# Patient Record
Sex: MALE | Race: WHITE | NOT HISPANIC OR LATINO | URBAN - METROPOLITAN AREA
[De-identification: names, ages, dates, MRNs, and addresses within clinical notes are randomized per-mention and may not be internally consistent; named-entity substitution may affect disease eponyms.]

---

## 2017-09-14 ENCOUNTER — RECORDS - HEALTHEAST (OUTPATIENT)
Dept: LAB | Facility: CLINIC | Age: 52
End: 2017-09-14

## 2017-09-18 ENCOUNTER — RECORDS - HEALTHEAST (OUTPATIENT)
Dept: LAB | Facility: CLINIC | Age: 52
End: 2017-09-18

## 2017-09-18 LAB
GLIADIN IGA SER-ACNC: 0.4 U/ML
GLIADIN IGG SER-ACNC: <0.4 U/ML
IGA SERPL-MCNC: 211 MG/DL (ref 65–400)
TTG IGA SER-ACNC: 0.2 U/ML
TTG IGG SER-ACNC: <0.6 U/ML

## 2017-09-19 LAB — HCV AB SERPL QL IA: NEGATIVE

## 2017-09-20 LAB
ANA SER QL: 0.1 U
HBV CORE AB SERPL QL IA: NEGATIVE
HEPATITIS B SURFACE ANTIBODY LHE- HISTORICAL: NEGATIVE

## 2019-01-30 ENCOUNTER — TRANSFERRED RECORDS (OUTPATIENT)
Dept: HEALTH INFORMATION MANAGEMENT | Facility: CLINIC | Age: 54
End: 2019-01-30

## 2019-01-31 ENCOUNTER — TRANSFERRED RECORDS (OUTPATIENT)
Dept: HEALTH INFORMATION MANAGEMENT | Facility: CLINIC | Age: 54
End: 2019-01-31

## 2019-02-01 ENCOUNTER — OFFICE VISIT (OUTPATIENT)
Dept: NEUROLOGY | Facility: CLINIC | Age: 54
End: 2019-02-01
Payer: COMMERCIAL

## 2019-02-01 VITALS
BODY MASS INDEX: 26.52 KG/M2 | WEIGHT: 165 LBS | SYSTOLIC BLOOD PRESSURE: 155 MMHG | DIASTOLIC BLOOD PRESSURE: 85 MMHG | HEART RATE: 67 BPM | OXYGEN SATURATION: 97 % | TEMPERATURE: 98 F | RESPIRATION RATE: 16 BRPM | HEIGHT: 66 IN

## 2019-02-01 DIAGNOSIS — G43.109 MIGRAINE WITH AURA AND WITHOUT STATUS MIGRAINOSUS, NOT INTRACTABLE: Primary | ICD-10-CM

## 2019-02-01 DIAGNOSIS — R93.0 ABNORMAL MRI SCAN, HEAD: ICD-10-CM

## 2019-02-01 DIAGNOSIS — I65.01 VERTEBRAL ARTERY STENOSIS, ASYMPTOMATIC, RIGHT: ICD-10-CM

## 2019-02-01 RX ORDER — IBUPROFEN 600 MG/1
600 TABLET, FILM COATED ORAL
COMMUNITY
Start: 2015-03-31

## 2019-02-01 SDOH — HEALTH STABILITY: MENTAL HEALTH: HOW OFTEN DO YOU HAVE A DRINK CONTAINING ALCOHOL?: MONTHLY OR LESS

## 2019-02-01 ASSESSMENT — ENCOUNTER SYMPTOMS
DISTURBANCES IN COORDINATION: 0
SEIZURES: 0
WEAKNESS: 0
SPEECH CHANGE: 0
TREMORS: 0
DOUBLE VISION: 1
LOSS OF CONSCIOUSNESS: 0
HEADACHES: 1
PARALYSIS: 0
MEMORY LOSS: 0
NUMBNESS: 0
DIZZINESS: 1
TINGLING: 0

## 2019-02-01 ASSESSMENT — PAIN SCALES - GENERAL: PAINLEVEL: MILD PAIN (3)

## 2019-02-01 ASSESSMENT — MIFFLIN-ST. JEOR: SCORE: 1531.19

## 2019-02-01 NOTE — NURSING NOTE
Chief Complaint   Patient presents with     Consult     P NEW CONSULT FOR VISION/HEADACHE      Headache     Distored Vision     Delroy Herron, EMT

## 2019-02-01 NOTE — LETTER
2019       RE: Sudheer Srivastava  [address] [place] [address]     Dear Colleague,    Thank you for referring your patient, Sudheer Srivastava, to the [facility] NEUROLOGY at [facility]. Please see a copy of my visit note below.    Service Date: 2019      RE: Sudheer Srivastava   MRN: [id number]   : 1965      Dear Dr. Estrada:      Your patient, Sudheer Srivastava, requested comprehensive neurologic consultation today on 2019.  His chief complaint is that of a complicated migraine as well as right vertebral stenosis.      The patient did come today with complicated history.  He said that this last Wednesday he was at a dinner party with coworkers.  They were celebrating.  The patient said that he was eating steak, lobster and spicy crab.  He had started to drink very expensive red wine that had a strong tannin taste.  In addition, he was looking at his coworker and behind that person there was a bright mirror.  He suddenly got the illusion of his environment shifting.  It was hard for him to describe this.  He said it was almost as if he was looking at a kaleidoscope and did metamorphopsia.  The patient did go immediately to the bathroom.  He then suffered a severe headache involving the right retro-orbital area.  His coworkers could tell that he just did not look right.  He was brought to [facility].  He described the headache as building up within 5 minutes and being on the order of 7/10.  He did note that the headache improved once he was in the hospital and by the following day it was markedly better.  Since that time he has noted an annoyance that is hard for him to describe involving the occipital portion of his head.  The patient said this does radiate into the right posterior parietal area.  He notes that his visual change did gradually improve also by the following day.  He noted by noon the next day it basically had left.  It was  "hard for him to describe.  He also noted some stiffness of the right side of his neck but he had no stiffness for forward flexion.  The headache did not come on as a thunderclap.  He probably did not have oscillopsia, but one of his co-workers said his eyes were \"in different directions.\"  He noted no color change and there was nothing to suggest scintillating scotomata.  He had no loss of vision.  The patient did note that he did take Tylenol for persistent headache and it has helped.  He did not recall having any poor sleep before the onset of this event.  There was no stress involved.  He had drink caffeinated coffee that morning.  The patient did recall having marked sensitivity to light in the past and he had migraines from approximately age [age].  He did recall then glare setting off headaches and he also noted sonophobia.  He did not recall any nausea or vomiting with those headaches and did not have it now.  He had no other associated symptoms now are then including vertigo, faintness, weakness or paresthesias.  There was no disequilibrium.  The patient did recall that when he had headaches in the past they would come sometimes coming off of stress.  He did describe his headaches then as being holocephalic.  The patient can recall having these headaches about every 2 months.  He never noticed any other inciting factors including poor sleep, skipping meals, stress or change in weather.  The patient said that typically he has drank 2 cups of caffeinated [place] Coffee per day.  He does not eat chocolate nor foods with nitrates or monosodium glutamate, nor with tyramine.  He did note that he has had dizziness when he has looked up overhead, especially looking at planes.  He has had this for over 25 years.  He has not had chiropractic manipulation of his neck and does not do yoga.  He denied any history of neck trauma.  He has not had headaches with coughing, sneezing, straining, bending, lifting or " intercourse.  He did note that his father had a history of migraine.  His father is alive but has had coronary artery disease at 78.  He started having coronary disease in his 40s.  His father, siblings have had a very strong history of coronary artery disease with premature heart attacks and death.  His mother is alive at 77 and is well, but has neuropathy.  He has a brother and sister who are fine.  He did have prior history of ACL repair on the left knee.  He also had a successful right L3-L4 hemilaminectomy in 2015 with Dr. Ming Yanes.  The patient did note that this last headache was a throbbing type of discomfort.      I was able to review multiple records on the patient from the [facility] website.  He underwent the right L3-L4 right hemilaminectomy on 03/24/2015 and had excellent results with his symptoms resolving.      The patient has had multiple records from [facility] but it did not include his actual MRA/MRI studies.  He had multiple laboratory tests there including essentially normal venous blood gases, metabolic profile, fibrinogen, lactate, although initial lactate was 2.6 which was elevated with normal being up to 2.1.  His hemoglobin was 16.5.  His glucose was 125.  His white count was elevated at 10,830.  He had a platelet count of 244,000.  The patient had negative troponins.  His brain MRI scan showed a few nonspecific foci of T2 hyperintensity in the cerebral white matter.  This was read by Dr. Hunt on 01/31/2019.  He had on 01/30/2019 a normal CT scan of the head with some nonspecific periventricular white matter changes.  His neck CT angiogram demonstrated severe stenosis of the right vertebral artery origin.  The intracranial study was normal.  The patient did have other records available to me that I reviewed with them.  These included records of the Neurology resident, Dr. Shaye Kim.  The patient's attending doctor was Dr. Acevedo.  At that time they recorded that  he was experiencing double vision and sudden onset of severe headache around the right eye and temple area.  He had had some migraines in the past, but they were not severe.  He did tell me he did not have double vision but instead described metamorphopsia.  The patient did have what appeared to be a normal neurologic examination.  He was kept overnight in the hospital and discharged home the following day.  The patient otherwise denied any other issues.  I do note he also had a normal EKG then at [facility].      Neurologic examination revealed a pleasant man.  He was not in any acute distress.  His blood pressure is 155/85 with a pulse of 67 done by medical assistant with a blood pressure machine.  I retook it with a soft cuff and it was 120/78 with a pulse of 66.  He had a regular cardiac rhythm without gallops.  He did have a soft murmur heard at the apex to the base of his heart.  This could have been tambor.  The patient did not have cervical bruits.  His lungs were clear to auscultation.  Otherwise, gait, station, cerebellar testing, muscle stretch reflexes, plantar stimulation, strength, cranial nerve examination, superficial and cortical sensory testing are all unremarkable.  He had normal eyegrounds and had no signs of any type of retinal or disk problems.  There were no hemorrhages noted.  The patient had a supple neck.      IMPRESSION:   1.  Complicated migraine.   2.  Prior history of migraine in his 20s.   3.  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      The patient did promise to return with the CD ROM of his radiologic studies.  I have asked that he start 81 mg of aspirin.  He evidently has had some white matter changes on his MRI scan.  I went over causes of this with the patient and his wife.  I did ask that he now get fasting lipids done.  Unfortunately, he was not fasting today.  With his father's history of significant coronary artery disease in his 40s and strong history of premature  coronary artery disease on his father's side, I did suggest that he consider having a stress test done with referral from you.  I did suggest too that he have a stress echo.  The patient will be seen back here shortly.  Depending on review of his radiologic studies, I will make other recommendations.      I spent 75 minutes with the patient today.  Over 50% of the time this involved counseling and coordination of care.  A complete review of medical systems was done and a positive review is listed in the report above.      D: 2019   T: 2019   MT: EKTA      Name:     SAL GAN   MRN:      [id number]        Account:      [id number]   :      1965           Service Date: 2019      Document: [id number]        Again, thank you for allowing me to participate in the care of your patient.      Sincerely,    Carlos Smith MD  CC:  Wiliam Estrada MD   [address], [address]   [place] [id number]

## 2019-02-07 ENCOUNTER — OFFICE VISIT (OUTPATIENT)
Dept: NEUROLOGY | Facility: CLINIC | Age: 54
End: 2019-02-07
Payer: COMMERCIAL

## 2019-02-07 VITALS
WEIGHT: 174 LBS | BODY MASS INDEX: 27.97 KG/M2 | HEART RATE: 64 BPM | SYSTOLIC BLOOD PRESSURE: 136 MMHG | HEIGHT: 66 IN | DIASTOLIC BLOOD PRESSURE: 75 MMHG | OXYGEN SATURATION: 97 %

## 2019-02-07 DIAGNOSIS — I65.01 VERTEBRAL ARTERY STENOSIS, RIGHT: ICD-10-CM

## 2019-02-07 DIAGNOSIS — R93.0 ABNORMAL MRI OF HEAD: Primary | ICD-10-CM

## 2019-02-07 RX ORDER — ASPIRIN 81 MG/1
81 TABLET, CHEWABLE ORAL DAILY
COMMUNITY

## 2019-02-07 ASSESSMENT — MIFFLIN-ST. JEOR: SCORE: 1572.01

## 2019-02-07 ASSESSMENT — PAIN SCALES - GENERAL: PAINLEVEL: NO PAIN (0)

## 2019-02-07 NOTE — NURSING NOTE
Chief Complaint   Patient presents with     RECHECK     VISION AND HEADACHES       Viviane Tipton MA

## 2019-02-07 NOTE — LETTER
"2019       RE: Sudheer Srivastava  [address] [place] [address]     Dear Colleague,    Thank you for referring your patient, Sudheer Srivastava, to the [facility] NEUROLOGY at [facility]. Please see a copy of my visit note below.    Service Date: 2019      RE: Sudheer Srivastava   MRN: [id number]   : 1965      Dear Dr. Estrada:      This is in regard to followup on Sudheer Srivastava.  The patient returned today with a chief complaint of complicated migraine and right vertebral artery stenosis.      The patient was able to get his CD ROM from [facility] of his radiologic studies.  I reviewed these with the patient and his wife.  He does have a number of hyperintense punctate T2 signal abnormalities.  I suspect this does represent either his prior history of migraine or could represent lipid disorder.  He has not had passive smoke inhalation and has never used marijuana or illicit drugs.  The patient did note that his headache has totally left now, although he then said possibly he has a \"dull feeling\" in the back of his head.  He denied any other symptoms.  He said gradually he continued to improve.  Unfortunately, the patient did eat breakfast this morning and he will not be able to get a fasting lipid study.  I have recommended that he do this under your direction.  In the past, I could tell through the Epic website his LDL cholesterol had been elevated.  I pointed out to him with his bad family history on his father's side I would recommend treatment of his lipids.  I did go over potential risk of statins including 3%-4% incidence of arthralgias and myalgias and a much lesser risk for myositis and liver function abnormalities.  The patient has not had a stress test in some time and again I did suggest that he consider this under your direction.  The patient's MRA was reviewed today with Dr. Jesus, our neuroradiologist, and also Dr. Ansari, our inpatient stroke expert.  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  This stenosis could be congenital.  He does not have obvious atherosclerotic changes on these films.  I still cannot tell and did suggest that the patient take low dose aspirin.  Dr. Ansari did not feel he needed a follow up study unless he had new complaints.  The consensus was this was an area of stenosis, but as to causation it was difficult to tell, but again it could be congenital.  I again went over with the patient and his wife the need to avoid any type of strain that could cause a vertebral artery dissection.  I went over activities that this could be associated with.  The patient and his wife did review with me the occurrence of this headache and I told him I cannot predict that this would not happen again.  I did suggest that if it does, he take aspirin with caffeine such as Excedrin Migraine or aspirin with a caffeinated beverage.  His blood pressure was again recorded at 136/75 with a pulse of 64 with a blood pressure machine by a medical assistant.  I retook it and it was 112/62 with a regular pulse of 64.      I told the patient I would be happy to see him in the future and on a p.r.n. basis.  Again, I went over with him warning signs and risk factors for stroke.  Specifically, I warned him about the need to have immediate evaluation if he has any new visual disturbance including metamorphopsia, amaurosis fugax, diplopia or other visual obscuration, new headache, focal or generalized weakness, paresthesias, vertigo, speech disturbance or other sudden neurologic complaints.  He is going to be seen in this clinic on a p.r.n. basis.  He and his wife did review with me again inciting factors that could lead to migraine.  Again, I went over constellation of findings including foods and beverages, as well as environmental issues that could lead to neurologic complaints in a patient who is subject to migraine.      I hope this  information is of use to you.  If you should have any questions, please feel free to contact me.          I spent 35 minutes with the patient today.  Over 50% of the time this involved counseling and coordination of care.        D: 2019   T: 2019   MT: EKTA      Name:     SAL GAN   MRN:      [id number]        Account:      [id number]   :      1965           Service Date: 2019      Document: [id number]       Again, thank you for allowing me to participate in the care of your patient.      Sincerely,    Carlos Smith MD    CC:  Wiliam Estrada MD   [address], [address]   [place] [id number]

## 2019-02-08 ENCOUNTER — TELEPHONE (OUTPATIENT)
Facility: CLINIC | Age: 54
End: 2019-02-08

## 2019-02-08 NOTE — TELEPHONE ENCOUNTER
[facility] Call Center    Phone Message    May a detailed message be left on voicemail: yes    Reason for Call: Other: Pt returning call from Dr. Smith, please give pt a call back to discuss.     Action Taken: Message routed to:  [facility] ([facility]): Neurology

## 2019-02-08 NOTE — TELEPHONE ENCOUNTER
Discussed Wilmer's review of his CTA; suspect this is congenital. Will see prn. He will treat lipid disorder and get stress test. He's aware of warning signs of stroke and need to have immediate follow up if new or recurrent sx's

## 2019-02-13 NOTE — PROGRESS NOTES
Service Date: 2019      Wiliam Estrada MD   [address], [address]   [place] [address]      RE: Sudheer Srivastava   MRN: [id number]   : 1965      Dear Dr. Estrada:      Your patient, Sudheer Srivastava, requested comprehensive neurologic consultation today on 2019.  His chief complaint is that of a complicated migraine as well as right vertebral stenosis.      The patient did come today with complicated history.  He said that this last Wednesday he was at a dinner party with coworkers.  They were celebrating.  The patient said that he was eating steak, lobster and spicy crab.  He had started to drink very expensive red wine that had a strong tannin taste.  In addition, he was looking at his coworker and behind that person there was a bright mirror.  He suddenly got the illusion of his environment shifting.  It was hard for him to describe this.  He said it was almost as if he was looking at a kaleidoscope and did metamorphopsia.  The patient did go immediately to the bathroom.  He then suffered a severe headache involving the right retro-orbital area.  His coworkers could tell that he just did not look right.  He was brought to [facility].  He described the headache as building up within 5 minutes and being on the order of 7/10.  He did note that the headache improved once he was in the hospital and by the following day it was markedly better.  Since that time he has noted an annoyance that is hard for him to describe involving the occipital portion of his head.  The patient said this does radiate into the right posterior parietal area.  He notes that his visual change did gradually improve also by the following day.  He noted by noon the next day it basically had left.  It was hard for him to describe.  He also noted some stiffness of the right side of his neck but he had no stiffness for forward flexion.  The headache did not  "come on as a thunderclap.  He probably did not have oscillopsia, but one of his co-workers said his eyes were \"in different directions.\"  He noted no color change and there was nothing to suggest scintillating scotomata.  He had no loss of vision.  The patient did note that he did take Tylenol for persistent headache and it has helped.  He did not recall having any poor sleep before the onset of this event.  There was no stress involved.  He had drink caffeinated coffee that morning.  The patient did recall having marked sensitivity to light in the past and he had migraines from approximately age [age].  He did recall then glare setting off headaches and he also noted sonophobia.  He did not recall any nausea or vomiting with those headaches and did not have it now.  He had no other associated symptoms now are then including vertigo, faintness, weakness or paresthesias.  There was no disequilibrium.  The patient did recall that when he had headaches in the past they would come sometimes coming off of stress.  He did describe his headaches then as being holocephalic.  The patient can recall having these headaches about every 2 months.  He never noticed any other inciting factors including poor sleep, skipping meals, stress or change in weather.  The patient said that typically he has drank 2 cups of caffeinated [place] Coffee per day.  He does not eat chocolate nor foods with nitrates or monosodium glutamate, nor with tyramine.  He did note that he has had dizziness when he has looked up overhead, especially looking at planes.  He has had this for over 25 years.  He has not had chiropractic manipulation of his neck and does not do yoga.  He denied any history of neck trauma.  He has not had headaches with coughing, sneezing, straining, bending, lifting or intercourse.  He did note that his father had a history of migraine.  His father is alive but has had coronary artery disease at 78.  He started having coronary " disease in his 40s.  His father, siblings have had a very strong history of coronary artery disease with premature heart attacks and death.  His mother is alive at 77 and is well, but has neuropathy.  He has a brother and sister who are fine.  He did have prior history of ACL repair on the left knee.  He also had a successful right L3-L4 hemilaminectomy in 2015 with Dr. Ming Yanes.  The patient did note that this last headache was a throbbing type of discomfort.      I was able to review multiple records on the patient from the [facility] website.  He underwent the right L3-L4 right hemilaminectomy on 03/24/2015 and had excellent results with his symptoms resolving.      The patient has had multiple records from the [facility] but it did not include his actual MRA/MRI studies.  He had multiple laboratory tests there including essentially normal venous blood gases, metabolic profile, fibrinogen, lactate, although initial lactate was 2.6 which was elevated with normal being up to 2.1.  His hemoglobin was 16.5.  His glucose was 125.  His white count was elevated at 10,830.  He had a platelet count of 244,000.  The patient had negative troponins.  His brain MRI scan showed a few nonspecific foci of T2 hyperintensity in the cerebral white matter.  This was read by Dr. Hunt on 01/31/2019.  He had on 01/30/2019 a normal CT scan of the head with some nonspecific periventricular white matter changes.  His neck CT angiogram demonstrated severe stenosis of the right vertebral artery origin.  The intracranial study was normal.  The patient did have other records available to me that I reviewed with them.  These included records of the Neurology resident, Dr. Shaye Kim.  The patient's attending doctor was Dr. Acevedo.  At that time they recorded that he was experiencing double vision and sudden onset of severe headache around the right eye and temple area.  He had had some migraines in the past, but they  were not severe.  He did tell me he did not have double vision but instead described metamorphopsia.  The patient did have what appeared to be a normal neurologic examination.  He was kept overnight in the hospital and discharged home the following day.  The patient otherwise denied any other issues.  I do note he also had a normal E[place] then [place] [facility].      Neurologic examination revealed a pleasant man.  He was not in any acute distress.  His blood pressure is 155/85 with a pulse of 67 done by medical assistant with a blood pressure machine.  I retook it with a soft cuff and it was 120/78 with a pulse of 66.  He had a regular cardiac rhythm without gallops.  He did have a soft murmur heard at the apex to the base of his heart.  This could have been tambor.  The patient did not have cervical bruits.  His lungs were clear to auscultation.  Otherwise, gait, station, cerebellar testing, muscle stretch reflexes, plantar stimulation, strength, cranial nerve examination, superficial and cortical sensory testing are all unremarkable.  He had normal eyegrounds and had no signs of any type of retinal or disk problems.  There were no hemorrhages noted.  The patient had a supple neck.      IMPRESSION:   1.  Complicated migraine.   2.  Prior history of migraine in his 20s.   3.  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      The patient did promise to return with the CD ROM of his radiologic studies.  I have asked that he start 81 mg of aspirin.  He evidently has had some white matter changes on his MRI scan.  I went over causes of this with the patient and his wife.  I did ask that he now get fasting lipids done.  Unfortunately, he was not fasting today.  With his father's history of significant coronary artery disease in his 40s and strong history of premature coronary artery disease on his father's side, I did suggest that he consider having a stress test done with referral from you.  I did suggest too that he have a  stress echo.  The patient will be seen back here shortly.  Depending on review of his radiologic studies, I will make other recommendations.      Sincerely yours,       Juan J Smith MD      I spent 75 minutes with the patient today.  Over 50% of the time this involved counseling and coordination of care.  A complete review of medical systems was done and a positive review is listed in the report above.         JUAN J SMITH MD             D: 2019   T: 2019   MT: EKTA      Name:     SAL GAN   MRN:      [id number]        Account:      [id number]   :      1965           Service Date: 2019      Document: [id number]

## 2019-02-13 NOTE — PROGRESS NOTES
"Service Date: 2019      Wiliam Estrada MD   [address], [address]   [place] [address]      RE: Sudheer Srivastava   MRN: [id number]   : 1965      Dear Dr. Estrada:      This is in regard to followup on Sudheer Srivastava.  The patient returned today with a chief complaint of complicated migraine and right vertebral artery stenosis.      The patient was able to get his CD ROM from [facility] of his radiologic studies.  I reviewed these with the patient and his wife.  He does have a number of hyperintense punctate T2 signal abnormalities.  I suspect this does represent either his prior history of migraine or could represent lipid disorder.  He has not had passive smoke inhalation and has never used marijuana or illicit drugs.  The patient did note that his headache has totally left now, although he then said possibly he has a \"dull feeling\" in the back of his head.  He denied any other symptoms.  He said gradually he continued to improve.  Unfortunately, the patient did eat breakfast this morning and he will not be able to get a fasting lipid study.  I have recommended that he do this under your direction.  In the past, I could tell through the Epic website his LDL cholesterol had been elevated.  I pointed out to him with his bad family history on his father's side I would recommend treatment of his lipids.  I did go over potential risk of statins including 3%-4% incidence of arthralgias and myalgias and a much lesser risk for myositis and liver function abnormalities.  The patient has not had a stress test in some time and again I did suggest that he consider this under your direction.  The patient's MRA was reviewed today with Dr. Jesus, our neuroradiologist, and also Dr. Ansari, our inpatient stroke expert.  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  This stenosis could be congenital.  He does not have obvious atherosclerotic " changes on these films.  I still cannot tell and did suggest that the patient take low dose aspirin.  Dr. Ansari did not feel he needed a follow up study unless he had new complaints.  The consensus was this was an area of stenosis, but as to causation it was difficult to tell, but again it could be congenital.  I again went over with the patient and his wife the need to avoid any type of strain that could cause a vertebral artery dissection.  I went over activities that this could be associated with.  The patient and his wife did review with me the occurrence of this headache and I told him I cannot predict that this would not happen again.  I did suggest that if it does, he take aspirin with caffeine such as Excedrin Migraine or aspirin with a caffeinated beverage.  His blood pressure was again recorded at 136/75 with a pulse of 64 with a blood pressure machine by a medical assistant.  I retook it and it was 112/62 with a regular pulse of 64.      I told the patient I would be happy to see him in the future and on a p.r.n. basis.  Again, I went over with him warning signs and risk factors for stroke.  Specifically, I warned him about the need to have immediate evaluation if he has any new visual disturbance including metamorphopsia, amaurosis fugax, diplopia or other visual obscuration, new headache, focal or generalized weakness, paresthesias, vertigo, speech disturbance or other sudden neurologic complaints.  He is going to be seen in this clinic on a p.r.n. basis.  He and his wife did review with me again inciting factors that could lead to migraine.  Again, I went over constellation of findings including foods and beverages, as well as environmental issues that could lead to neurologic complaints in a patient who is subject to migraine.      I hope this information is of use to you.  If you should have any questions, please feel free to contact me.        Sincerely yours,       Carlos Smith MD      I  spent 35 minutes with the patient today.  Over 50% of the time this involved counseling and coordination of care.         JUAN J HODGES MD             D: 2019   T: 2019   MT: EKTA      Name:     SAL GAN   MRN:      [id number]        Account:      [id number]   :      1965           Service Date: 2019      Document: [id number]

## 2019-02-28 ENCOUNTER — RECORDS - HEALTHEAST (OUTPATIENT)
Dept: LAB | Facility: CLINIC | Age: 54
End: 2019-02-28

## 2019-02-28 LAB
CHOLEST SERPL-MCNC: 244 MG/DL
FASTING STATUS PATIENT QL REPORTED: ABNORMAL
FASTING STATUS PATIENT QL REPORTED: NORMAL
GLUCOSE BLD-MCNC: 107 MG/DL (ref 70–125)
HDLC SERPL-MCNC: 55 MG/DL
LDLC SERPL CALC-MCNC: 156 MG/DL
PSA SERPL-MCNC: 1.2 NG/ML (ref 0–3.5)
TRIGL SERPL-MCNC: 165 MG/DL

## 2019-12-05 ENCOUNTER — RECORDS - HEALTHEAST (OUTPATIENT)
Dept: LAB | Facility: CLINIC | Age: 54
End: 2019-12-05

## 2019-12-05 LAB
ANION GAP SERPL CALCULATED.3IONS-SCNC: 10 MMOL/L (ref 5–18)
BUN SERPL-MCNC: 14 MG/DL (ref 8–22)
CALCIUM SERPL-MCNC: 10.2 MG/DL (ref 8.5–10.5)
CHLORIDE BLD-SCNC: 104 MMOL/L (ref 98–107)
CHOLEST SERPL-MCNC: 182 MG/DL
CO2 SERPL-SCNC: 27 MMOL/L (ref 22–31)
CREAT SERPL-MCNC: 1.15 MG/DL (ref 0.7–1.3)
FASTING STATUS PATIENT QL REPORTED: NORMAL
GFR SERPL CREATININE-BSD FRML MDRD: >60 ML/MIN/1.73M2
GLUCOSE BLD-MCNC: 109 MG/DL (ref 70–125)
HDLC SERPL-MCNC: 54 MG/DL
LDLC SERPL CALC-MCNC: 104 MG/DL
POTASSIUM BLD-SCNC: 4.8 MMOL/L (ref 3.5–5)
SODIUM SERPL-SCNC: 141 MMOL/L (ref 136–145)
TRIGL SERPL-MCNC: 118 MG/DL
TSH SERPL DL<=0.005 MIU/L-ACNC: 1.89 UIU/ML (ref 0.3–5)

## 2020-09-08 ENCOUNTER — RECORDS - HEALTHEAST (OUTPATIENT)
Dept: LAB | Facility: CLINIC | Age: 55
End: 2020-09-08

## 2020-09-08 ENCOUNTER — HOSPITAL ENCOUNTER (OUTPATIENT)
Dept: LAB | Age: 55
Setting detail: SPECIMEN
Discharge: HOME | End: 2020-09-08

## 2020-09-08 LAB
ANION GAP SERPL CALCULATED.3IONS-SCNC: 10 MMOL/L (ref 5–18)
BUN SERPL-MCNC: 14 MG/DL (ref 8–22)
CALCIUM SERPL-MCNC: 10 MG/DL (ref 8.5–10.5)
CHLORIDE BLD-SCNC: 103 MMOL/L (ref 98–107)
CHOLEST SERPL-MCNC: 228 MG/DL
CO2 SERPL-SCNC: 28 MMOL/L (ref 22–31)
CREAT SERPL-MCNC: 0.96 MG/DL (ref 0.7–1.3)
FASTING STATUS PATIENT QL REPORTED: ABNORMAL
GFR SERPL CREATININE-BSD FRML MDRD: >60 ML/MIN/1.73M2
GLUCOSE BLD-MCNC: 86 MG/DL (ref 70–125)
HDLC SERPL-MCNC: 52 MG/DL
LDLC SERPL CALC-MCNC: 137 MG/DL
POTASSIUM BLD-SCNC: 3.9 MMOL/L (ref 3.5–5)
SODIUM SERPL-SCNC: 141 MMOL/L (ref 136–145)
TRIGL SERPL-MCNC: 193 MG/DL

## 2020-09-09 LAB — COVID-19 ANTIBODY IGG: NEGATIVE

## 2021-05-27 ENCOUNTER — RECORDS - HEALTHEAST (OUTPATIENT)
Dept: ADMINISTRATIVE | Facility: CLINIC | Age: 56
End: 2021-05-27

## 2021-08-25 ENCOUNTER — LAB REQUISITION (OUTPATIENT)
Dept: LAB | Facility: CLINIC | Age: 56
End: 2021-08-25

## 2021-08-25 DIAGNOSIS — Z00.00 ENCOUNTER FOR GENERAL ADULT MEDICAL EXAMINATION WITHOUT ABNORMAL FINDINGS: ICD-10-CM

## 2021-08-25 DIAGNOSIS — I10 ESSENTIAL (PRIMARY) HYPERTENSION: ICD-10-CM

## 2021-08-25 DIAGNOSIS — E78.2 MIXED HYPERLIPIDEMIA: ICD-10-CM

## 2021-08-25 LAB
ANION GAP SERPL CALCULATED.3IONS-SCNC: 12 MMOL/L (ref 5–18)
BUN SERPL-MCNC: 18 MG/DL (ref 8–22)
CALCIUM SERPL-MCNC: 10.1 MG/DL (ref 8.5–10.5)
CHLORIDE BLD-SCNC: 102 MMOL/L (ref 98–107)
CHOLEST SERPL-MCNC: 174 MG/DL
CO2 SERPL-SCNC: 26 MMOL/L (ref 22–31)
CREAT SERPL-MCNC: 1.17 MG/DL (ref 0.7–1.3)
GFR SERPL CREATININE-BSD FRML MDRD: 69 ML/MIN/1.73M2
GLUCOSE BLD-MCNC: 93 MG/DL (ref 70–125)
HDLC SERPL-MCNC: 56 MG/DL
LDLC SERPL CALC-MCNC: 96 MG/DL
POTASSIUM BLD-SCNC: 4 MMOL/L (ref 3.5–5)
PSA SERPL-MCNC: 2.13 UG/L (ref 0–3.5)
SODIUM SERPL-SCNC: 140 MMOL/L (ref 136–145)
TRIGL SERPL-MCNC: 109 MG/DL

## 2021-08-25 PROCEDURE — G0103 PSA SCREENING: HCPCS | Performed by: FAMILY MEDICINE

## 2021-08-25 PROCEDURE — 80048 BASIC METABOLIC PNL TOTAL CA: CPT | Performed by: FAMILY MEDICINE

## 2021-08-25 PROCEDURE — 80061 LIPID PANEL: CPT | Performed by: FAMILY MEDICINE

## 2021-09-23 ENCOUNTER — LAB REQUISITION (OUTPATIENT)
Dept: LAB | Facility: CLINIC | Age: 56
End: 2021-09-23

## 2021-09-23 DIAGNOSIS — D48.9 NEOPLASM OF UNCERTAIN BEHAVIOR, UNSPECIFIED: ICD-10-CM

## 2021-09-23 PROCEDURE — 88305 TISSUE EXAM BY PATHOLOGIST: CPT | Performed by: PATHOLOGY

## 2021-09-27 LAB
PATH REPORT.COMMENTS IMP SPEC: NORMAL
PATH REPORT.COMMENTS IMP SPEC: NORMAL
PATH REPORT.FINAL DX SPEC: NORMAL
PATH REPORT.GROSS SPEC: NORMAL
PATH REPORT.MICROSCOPIC SPEC OTHER STN: NORMAL
PATH REPORT.RELEVANT HX SPEC: NORMAL
PHOTO IMAGE: NORMAL

## 2022-03-02 ENCOUNTER — LAB REQUISITION (OUTPATIENT)
Dept: LAB | Facility: CLINIC | Age: 57
End: 2022-03-02

## 2022-03-02 DIAGNOSIS — I10 ESSENTIAL (PRIMARY) HYPERTENSION: ICD-10-CM

## 2022-03-02 DIAGNOSIS — E78.2 MIXED HYPERLIPIDEMIA: ICD-10-CM

## 2022-03-02 LAB
ANION GAP SERPL CALCULATED.3IONS-SCNC: 12 MMOL/L (ref 5–18)
BUN SERPL-MCNC: 17 MG/DL (ref 8–22)
CALCIUM SERPL-MCNC: 9.7 MG/DL (ref 8.5–10.5)
CHLORIDE BLD-SCNC: 103 MMOL/L (ref 98–107)
CHOLEST SERPL-MCNC: 202 MG/DL
CO2 SERPL-SCNC: 27 MMOL/L (ref 22–31)
CREAT SERPL-MCNC: 1.11 MG/DL (ref 0.7–1.3)
FASTING STATUS PATIENT QL REPORTED: ABNORMAL
GFR SERPL CREATININE-BSD FRML MDRD: 77 ML/MIN/1.73M2
GLUCOSE BLD-MCNC: 133 MG/DL (ref 70–125)
HDLC SERPL-MCNC: 56 MG/DL
LDLC SERPL CALC-MCNC: 112 MG/DL
POTASSIUM BLD-SCNC: 3.9 MMOL/L (ref 3.5–5)
SODIUM SERPL-SCNC: 142 MMOL/L (ref 136–145)
TRIGL SERPL-MCNC: 170 MG/DL

## 2022-03-02 PROCEDURE — 82310 ASSAY OF CALCIUM: CPT | Performed by: FAMILY MEDICINE

## 2022-03-02 PROCEDURE — 80061 LIPID PANEL: CPT | Mod: ORL | Performed by: FAMILY MEDICINE

## 2023-08-16 ENCOUNTER — LAB REQUISITION (OUTPATIENT)
Dept: LAB | Facility: CLINIC | Age: 58
End: 2023-08-16
Payer: COMMERCIAL

## 2023-08-16 DIAGNOSIS — Z12.5 ENCOUNTER FOR SCREENING FOR MALIGNANT NEOPLASM OF PROSTATE: ICD-10-CM

## 2023-08-16 DIAGNOSIS — I10 ESSENTIAL (PRIMARY) HYPERTENSION: ICD-10-CM

## 2023-08-16 DIAGNOSIS — E78.2 MIXED HYPERLIPIDEMIA: ICD-10-CM

## 2023-08-16 PROCEDURE — G0103 PSA SCREENING: HCPCS | Mod: ORL | Performed by: FAMILY MEDICINE

## 2023-08-16 PROCEDURE — 80048 BASIC METABOLIC PNL TOTAL CA: CPT | Mod: ORL | Performed by: FAMILY MEDICINE

## 2023-08-16 PROCEDURE — 80061 LIPID PANEL: CPT | Mod: ORL | Performed by: FAMILY MEDICINE

## 2023-08-17 LAB
ANION GAP SERPL CALCULATED.3IONS-SCNC: 14 MMOL/L (ref 7–15)
BUN SERPL-MCNC: 13.8 MG/DL (ref 8–23)
CALCIUM SERPL-MCNC: 9.8 MG/DL (ref 8.6–10)
CHLORIDE SERPL-SCNC: 101 MMOL/L (ref 98–107)
CHOLEST SERPL-MCNC: 181 MG/DL
CREAT SERPL-MCNC: 0.99 MG/DL (ref 0.67–1.17)
DEPRECATED HCO3 PLAS-SCNC: 27 MMOL/L (ref 22–29)
GFR SERPL CREATININE-BSD FRML MDRD: 88 ML/MIN/1.73M2
GLUCOSE SERPL-MCNC: 95 MG/DL (ref 70–99)
HDLC SERPL-MCNC: 52 MG/DL
LDLC SERPL CALC-MCNC: 103 MG/DL
NONHDLC SERPL-MCNC: 129 MG/DL
POTASSIUM SERPL-SCNC: 4 MMOL/L (ref 3.4–5.3)
PSA SERPL DL<=0.01 NG/ML-MCNC: 2.3 NG/ML (ref 0–3.5)
SODIUM SERPL-SCNC: 142 MMOL/L (ref 136–145)
TRIGL SERPL-MCNC: 131 MG/DL

## 2024-03-29 ENCOUNTER — LAB REQUISITION (OUTPATIENT)
Dept: LAB | Facility: CLINIC | Age: 59
End: 2024-03-29
Payer: COMMERCIAL

## 2024-03-29 DIAGNOSIS — E78.2 MIXED HYPERLIPIDEMIA: ICD-10-CM

## 2024-03-29 DIAGNOSIS — I10 ESSENTIAL (PRIMARY) HYPERTENSION: ICD-10-CM

## 2024-03-29 PROCEDURE — 80048 BASIC METABOLIC PNL TOTAL CA: CPT | Mod: ORL | Performed by: FAMILY MEDICINE

## 2024-03-29 PROCEDURE — 80061 LIPID PANEL: CPT | Mod: ORL | Performed by: FAMILY MEDICINE

## 2024-03-30 LAB
ANION GAP SERPL CALCULATED.3IONS-SCNC: 14 MMOL/L (ref 7–15)
BUN SERPL-MCNC: 15.8 MG/DL (ref 8–23)
CALCIUM SERPL-MCNC: 9.7 MG/DL (ref 8.6–10)
CHLORIDE SERPL-SCNC: 100 MMOL/L (ref 98–107)
CHOLEST SERPL-MCNC: 175 MG/DL
CREAT SERPL-MCNC: 1.09 MG/DL (ref 0.67–1.17)
DEPRECATED HCO3 PLAS-SCNC: 25 MMOL/L (ref 22–29)
EGFRCR SERPLBLD CKD-EPI 2021: 78 ML/MIN/1.73M2
FASTING STATUS PATIENT QL REPORTED: NORMAL
GLUCOSE SERPL-MCNC: 125 MG/DL (ref 70–99)
HDLC SERPL-MCNC: 50 MG/DL
LDLC SERPL CALC-MCNC: 98 MG/DL
NONHDLC SERPL-MCNC: 125 MG/DL
POTASSIUM SERPL-SCNC: 3.9 MMOL/L (ref 3.4–5.3)
SODIUM SERPL-SCNC: 139 MMOL/L (ref 135–145)
TRIGL SERPL-MCNC: 133 MG/DL

## 2025-07-22 ENCOUNTER — LAB REQUISITION (OUTPATIENT)
Dept: LAB | Facility: CLINIC | Age: 60
End: 2025-07-22

## 2025-07-22 DIAGNOSIS — Z12.5 ENCOUNTER FOR SCREENING FOR MALIGNANT NEOPLASM OF PROSTATE: ICD-10-CM

## 2025-07-22 DIAGNOSIS — I10 ESSENTIAL (PRIMARY) HYPERTENSION: ICD-10-CM

## 2025-07-22 DIAGNOSIS — E78.2 MIXED HYPERLIPIDEMIA: ICD-10-CM

## 2025-07-22 PROCEDURE — 80048 BASIC METABOLIC PNL TOTAL CA: CPT | Performed by: FAMILY MEDICINE

## 2025-07-22 PROCEDURE — 80061 LIPID PANEL: CPT | Performed by: FAMILY MEDICINE

## 2025-07-22 PROCEDURE — G0103 PSA SCREENING: HCPCS | Performed by: FAMILY MEDICINE

## 2025-07-23 LAB
ANION GAP SERPL CALCULATED.3IONS-SCNC: 14 MMOL/L (ref 7–15)
BUN SERPL-MCNC: 13.4 MG/DL (ref 8–23)
CALCIUM SERPL-MCNC: 9.9 MG/DL (ref 8.8–10.4)
CHLORIDE SERPL-SCNC: 98 MMOL/L (ref 98–107)
CHOLEST SERPL-MCNC: 199 MG/DL
CREAT SERPL-MCNC: 1.1 MG/DL (ref 0.67–1.17)
EGFRCR SERPLBLD CKD-EPI 2021: 76 ML/MIN/1.73M2
FASTING STATUS PATIENT QL REPORTED: ABNORMAL
FASTING STATUS PATIENT QL REPORTED: ABNORMAL
GLUCOSE SERPL-MCNC: 110 MG/DL (ref 70–99)
HCO3 SERPL-SCNC: 26 MMOL/L (ref 22–29)
HDLC SERPL-MCNC: 68 MG/DL
LDLC SERPL CALC-MCNC: 89 MG/DL
NONHDLC SERPL-MCNC: 131 MG/DL
POTASSIUM SERPL-SCNC: 3.8 MMOL/L (ref 3.4–5.3)
PSA SERPL DL<=0.01 NG/ML-MCNC: 3.28 NG/ML (ref 0–4.5)
SODIUM SERPL-SCNC: 138 MMOL/L (ref 135–145)
TRIGL SERPL-MCNC: 210 MG/DL